# Patient Record
Sex: FEMALE | Race: WHITE | NOT HISPANIC OR LATINO | Employment: OTHER | URBAN - METROPOLITAN AREA
[De-identification: names, ages, dates, MRNs, and addresses within clinical notes are randomized per-mention and may not be internally consistent; named-entity substitution may affect disease eponyms.]

---

## 2019-04-12 NOTE — PATIENT DISCUSSION
Pt called and wanted a update on the referral. Update given and informed pt to wait at least two weeks. Pt states understanding    Recommended warm compresses.

## 2019-05-13 NOTE — PROCEDURE NOTE: CLINICAL
PROCEDURE NOTE: Punctal Plugs, Michaela Youssef ([id number], [id number]) #1 OU. Diagnosis: Keratoconjunctivitis Sicca, Not Specified As Sjögren's. Kaela CARLOS inserted BLL, no complications. 0.5mm/0.5mm.

## 2019-09-13 ENCOUNTER — CATARACT CONSULT (OUTPATIENT)
Dept: URBAN - METROPOLITAN AREA CLINIC 39 | Facility: CLINIC | Age: 54
End: 2019-09-13

## 2019-09-13 DIAGNOSIS — H52.7: ICD-10-CM

## 2019-09-13 DIAGNOSIS — H04.123: ICD-10-CM

## 2019-09-13 DIAGNOSIS — H25.093: ICD-10-CM

## 2019-09-13 PROCEDURE — 99499RLE REFRACTIVE CONSULT/RLE

## 2019-09-13 PROCEDURE — V2799PMN IMPRIMIS PRED-MOXI-NEPAF 5ML

## 2019-09-13 ASSESSMENT — VISUAL ACUITY
OS_CC: 20/25
OD_SC: 20/30-1
OS_SC: 20/40-2
OS_CC: J1
OD_SC: J12
OD_RAM: 20/20
OD_BAT: 20/30
OS_SC: J12
OS_BAT: 20/30
OD_CC: J1
OS_AM: 20/20
OD_CC: 20/25

## 2019-09-13 ASSESSMENT — TONOMETRY
OD_IOP_MMHG: 13
OS_IOP_MMHG: 15

## 2019-09-24 ENCOUNTER — POST-OP (OUTPATIENT)
Dept: URBAN - METROPOLITAN AREA CLINIC 39 | Facility: CLINIC | Age: 54
End: 2019-09-24

## 2019-09-24 ENCOUNTER — SURGERY/PROCEDURE (OUTPATIENT)
Dept: URBAN - METROPOLITAN AREA CLINIC 39 | Facility: CLINIC | Age: 54
End: 2019-09-24

## 2019-09-24 ENCOUNTER — PRE-OP/H&P (OUTPATIENT)
Dept: URBAN - METROPOLITAN AREA CLINIC 39 | Facility: CLINIC | Age: 54
End: 2019-09-24

## 2019-09-24 DIAGNOSIS — H25.092: ICD-10-CM

## 2019-09-24 DIAGNOSIS — Z96.1: ICD-10-CM

## 2019-09-24 DIAGNOSIS — H52.7: ICD-10-CM

## 2019-09-24 PROCEDURE — 66999RAV RLE WITH ADVANCED LENS

## 2019-09-24 PROCEDURE — 99211T TECH SERVICE

## 2019-09-24 PROCEDURE — 66999LNSR LENSAR LASER FOR CAT SX

## 2019-09-24 ASSESSMENT — TONOMETRY: OS_IOP_MMHG: 18

## 2019-09-24 ASSESSMENT — VISUAL ACUITY
OS_SC: J4
OS_SC: 20/40

## 2019-09-30 ENCOUNTER — POST-OP (OUTPATIENT)
Dept: URBAN - METROPOLITAN AREA CLINIC 39 | Facility: CLINIC | Age: 54
End: 2019-09-30

## 2019-09-30 DIAGNOSIS — Z96.1: ICD-10-CM

## 2019-09-30 PROCEDURE — 99024 POSTOP FOLLOW-UP VISIT: CPT

## 2019-09-30 ASSESSMENT — TONOMETRY
OS_IOP_MMHG: 13
OD_IOP_MMHG: 12

## 2019-09-30 ASSESSMENT — VISUAL ACUITY
OS_SC: 20/20
OD_SC: J10
OD_SC: 20/40-2

## 2019-10-07 ENCOUNTER — SURGERY/PROCEDURE (OUTPATIENT)
Dept: URBAN - METROPOLITAN AREA CLINIC 39 | Facility: CLINIC | Age: 54
End: 2019-10-07

## 2019-10-07 ENCOUNTER — PRE-OP/H&P (OUTPATIENT)
Dept: URBAN - METROPOLITAN AREA CLINIC 39 | Facility: CLINIC | Age: 54
End: 2019-10-07

## 2019-10-07 ENCOUNTER — POST-OP CATARACT (OUTPATIENT)
Dept: URBAN - METROPOLITAN AREA CLINIC 39 | Facility: CLINIC | Age: 54
End: 2019-10-07

## 2019-10-07 DIAGNOSIS — H52.7: ICD-10-CM

## 2019-10-07 DIAGNOSIS — Z96.1: ICD-10-CM

## 2019-10-07 DIAGNOSIS — H25.091: ICD-10-CM

## 2019-10-07 PROCEDURE — 66999LNSR LENSAR LASER FOR CAT SX

## 2019-10-07 PROCEDURE — 66999RAV RLE WITH ADVANCED LENS

## 2019-10-07 PROCEDURE — 99211T TECH SERVICE

## 2019-10-07 ASSESSMENT — VISUAL ACUITY: OD_SC: 20/30-2

## 2019-10-07 ASSESSMENT — TONOMETRY: OD_IOP_MMHG: 15

## 2019-10-14 ENCOUNTER — POST-OP (OUTPATIENT)
Dept: URBAN - METROPOLITAN AREA CLINIC 39 | Facility: CLINIC | Age: 54
End: 2019-10-14

## 2019-10-14 DIAGNOSIS — Z96.1: ICD-10-CM

## 2019-10-14 PROCEDURE — 99024 POSTOP FOLLOW-UP VISIT: CPT

## 2019-10-14 ASSESSMENT — VISUAL ACUITY
OD_SC: 20/20
OU: J2
OD_SC: J1+
OS_SC: J1+
OD: J4 @ 23"
OU_SC: 20/20
OU_SC: J1+
OS_SC: 20/20

## 2019-10-14 ASSESSMENT — TONOMETRY
OS_IOP_MMHG: 11
OD_IOP_MMHG: 11

## 2020-02-11 NOTE — PATIENT DISCUSSION
Warm Compresses. Use cold today until the swelling goes down. Then resume hot. Add Tobradex drops and zpack. F/U one week.

## 2020-02-17 ENCOUNTER — SURGERY/PROCEDURE (OUTPATIENT)
Dept: URBAN - METROPOLITAN AREA SURGERY 14 | Facility: SURGERY | Age: 55
End: 2020-02-17

## 2020-02-17 ENCOUNTER — CONSULT (OUTPATIENT)
Dept: URBAN - METROPOLITAN AREA CLINIC 39 | Facility: CLINIC | Age: 55
End: 2020-02-17

## 2020-02-17 DIAGNOSIS — H26.493: ICD-10-CM

## 2020-02-17 DIAGNOSIS — H26.492: ICD-10-CM

## 2020-02-17 DIAGNOSIS — H04.123: ICD-10-CM

## 2020-02-17 PROCEDURE — 66821 AFTER CATARACT LASER SURGERY: CPT

## 2020-02-17 PROCEDURE — 99024 POSTOP FOLLOW-UP VISIT: CPT

## 2020-02-17 ASSESSMENT — VISUAL ACUITY
OU_SC: J1
OU_SC: 20/20
OD_SC: J1 @ 15"
OS_BAT: 20/50
OS_SC: J1 @ 15"
OS_SC: 20/30+2
OD_BAT: 20/50
OD_SC: 20/25+2

## 2020-02-17 ASSESSMENT — TONOMETRY
OS_IOP_MMHG: 13
OD_IOP_MMHG: 14

## 2020-02-18 NOTE — PATIENT DISCUSSION
Continue same treatment. without zpack. Not resolved yet. Discussed referral to [facility] if not improving in a few months.

## 2020-03-03 NOTE — PATIENT DISCUSSION
Systane gel drops qhs OU. Continue [facility] as presribed. Refills sent to pharmacy today.

## 2020-03-03 NOTE — PATIENT DISCUSSION
Finished with AB meds now. Continue WC's BID to try and keep resolving in the meantime. Sched pt to see Rudy for incision and drainage if possible.

## 2020-03-10 NOTE — PATIENT DISCUSSION
The patient elects to proceed with surgical excision.  Consent form signed and photos taken.  Jonatan sanches BID CYDNEY until healed.  Follow up prn.

## 2020-03-10 NOTE — PROCEDURE NOTE: CLINICAL
PROCEDURE NOTE: Excision Chalazion, Single Left Upper Lid. Diagnosis: External Hordeolum. Risks, benefits and alternatives were discussed. Patient desires to proceed with excision and drainage of the chalazion today. A drop of proparacaine was instilled in the involved eye. The involved area was anesthetized using 1% lidocaine with epi. A chalazion clamp was placed on the eyelid and the lid was everted revealing the blocked gland. A 11 blade was used to make an incision into the blocked gland. A curette was used to remove the blocked oil. Cautery was used to achieve hemostasis. The clamp was removed and erythromycin ointment was instilled. Post op instructions were given. The patient tolerated the procedure well and left in good condition. They understand to call for any concerns. Yasemin Casas

## 2020-09-11 ENCOUNTER — LASIK CONSULTATION (OUTPATIENT)
Dept: URBAN - METROPOLITAN AREA CLINIC 43 | Facility: CLINIC | Age: 55
End: 2020-09-11

## 2020-09-11 DIAGNOSIS — H01.003: ICD-10-CM

## 2020-09-11 DIAGNOSIS — Z96.1: ICD-10-CM

## 2020-09-11 DIAGNOSIS — H01.006: ICD-10-CM

## 2020-09-11 DIAGNOSIS — Z98.890: ICD-10-CM

## 2020-09-11 DIAGNOSIS — H26.491: ICD-10-CM

## 2020-09-11 DIAGNOSIS — H04.123: ICD-10-CM

## 2020-09-11 DIAGNOSIS — H52.7: ICD-10-CM

## 2020-09-11 PROCEDURE — A4263 PERMANENT TEAR DUCT PLUG: HCPCS

## 2020-09-11 PROCEDURE — 68761S PUNCTUM PLUG / SILICONE,EACH

## 2020-09-11 PROCEDURE — 92025NC COMP. CORNEAL TOPO, UNI OR BILAT,

## 2020-09-11 PROCEDURE — 92012 INTRM OPH EXAM EST PATIENT: CPT

## 2020-09-11 PROCEDURE — 92134 CPTRZ OPH DX IMG PST SGM RTA: CPT

## 2020-09-11 RX ORDER — CYCLOSPORINE 0 MG/ML: SOLUTION/ DROPS OPHTHALMIC; TOPICAL

## 2020-09-11 ASSESSMENT — VISUAL ACUITY
OD_SC: J1
OD_SC: 20/20-1
OU_SC: 20/20
OU_SC: J1

## 2020-09-11 ASSESSMENT — TONOMETRY
OS_IOP_MMHG: 14
OD_IOP_MMHG: 15

## 2020-10-16 ENCOUNTER — FOLLOW UP (OUTPATIENT)
Dept: URBAN - METROPOLITAN AREA CLINIC 39 | Facility: CLINIC | Age: 55
End: 2020-10-16

## 2020-10-16 DIAGNOSIS — H04.123: ICD-10-CM

## 2020-10-16 DIAGNOSIS — H01.006: ICD-10-CM

## 2020-10-16 DIAGNOSIS — H01.003: ICD-10-CM

## 2020-10-16 PROCEDURE — 92012 INTRM OPH EXAM EST PATIENT: CPT

## 2020-10-16 ASSESSMENT — VISUAL ACUITY
OU_SC: J1
OS_SC: J1
OU_SC: 20/25+2
OD_SC: 20/25
OS_SC: 20/25
OD_SC: J1

## 2020-10-16 ASSESSMENT — TONOMETRY
OS_IOP_MMHG: 13
OD_IOP_MMHG: 13

## 2022-05-18 NOTE — PATIENT DISCUSSION
Pt has appt set for earlier comp in June to consider cataract surgery. If he decides to wait, then Sched comp in Nov 2022.

## 2022-12-01 NOTE — PATIENT DISCUSSION
Continue Xiidra BID OU. Refill sent. Pt to add gel Qhs. Cornea looks less dry today on Xiidra. Declines changing to Restasis or plugs for now.

## 2022-12-01 NOTE — PATIENT DISCUSSION
Pt to RTC for CL fit for dist to be sure he doesn't have any diplopia. He would like the advanced lenses if possible. If he has diplopia with the trial, then trial the mono.

## 2022-12-01 NOTE — PATIENT DISCUSSION
VF today to compare to OCT. OD not reliable, but first time done. OS was full and more accurate. Will repeat 1 year.

## 2022-12-30 NOTE — PATIENT DISCUSSION
Previous: Pt to RTC for CL fit for dist to be sure he doesn't have any diplopia. He would like the advanced lenses if possible. If he has diplopia with the trial, then trial the mono.

## 2022-12-30 NOTE — PATIENT DISCUSSION
12/30/2022 Today was a trial of DVO CL's to see if diplopic. In the office he was not. We used a loose lens to simulate mono and he did not like that as well. However, in the end, the pt has some other medical issues he wants to focus on and he does not feel he wants to pursue the cataracts at this time until it gets worse. F/U 1 year comp. Concern for Advanced lens due to prism. Vin.

## 2024-06-14 NOTE — PATIENT DISCUSSION
Left message for pt to return our call regarding appt.   Recommended artificial tears to use as directed.